# Patient Record
(demographics unavailable — no encounter records)

---

## 2024-11-19 NOTE — PHYSICAL EXAM
[Normal Mood and Affect] : normal mood and affect [Able to Communicate] : able to communicate [Well Developed] : well developed [Well Nourished] : well nourished [NL (0)] : extension 0 degrees [5___] : hamstring 5[unfilled]/5 [Right] : right knee [AP] : anteroposterior [Lateral] : lateral [[place]] : [place] [Advanced patellofemoral OA] : Advanced patellofemoral OA [] : negative Lachmann [FreeTextEntry3] : 2cm clean scab and abrasionover lateral tibial plateau. 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 Slight chondrocalcinosis as well. [TWNoteComboBox7] : flexion 135 degrees

## 2024-11-19 NOTE — PLAN
[TextEntry] : The natural progression of osteoarthritis was explained to the patient.  We discussed the possible treatment options from conservative to operative.  These included NSAIDs, Glucosamine and Chondroitin sulfate, and physical therapy as well different types of injections.  We also discussed that at some point they may progress to need a TKA.  Information and pamphlets were given.  Medication was injected into the above treated area. After verbal consent using sterile preparation and technique. The risks, benefits, and alternatives to cortisone injection were explained in full to the patient. Risks outlined include but are not limited to infection, sepsis, bleeding, scarring, skin discoloration, temporary increase in pain, syncopal episode, failure to resolve symptoms, allergic reaction, symptom recurrence, and elevation of blood sugar in diabetics. Patient understood the risks. All questions were answered. After discussion of options, patient requested an injection. Oral informed consent was obtained and sterile prep was done of the injection site. Sterile technique was utilized for the procedure including the preparation of the solutions used for the injection. Patient tolerated the procedure well. Advised to ice the injection site this evening. Prep with Betadine locally to site. Sterile technique used. Patient tolerated procedure well. Post Procedure Instructions: Patient was advised to call if redness, pain, or fever occur and apply ice for 15 min. out of every hour for the next 12-24 hours as tolerated.  The patient was instructed on the importance of ice and elevation of the extremity to decrease swelling and pain.

## 2024-11-19 NOTE — HISTORY OF PRESENT ILLNESS
[8] : 8 [Sharp] : sharp [Stabbing] : stabbing [Constant] : constant [Meds] : meds [Ice] : ice [Standing] : standing [Walking] : walking [Stairs] : stairs [Retired] : Work status: retired [FreeTextEntry7] : above and below knee [5] : 5 [de-identified] : 11/19/24:  Patient returns for recurrent right knee pain about six months after cortisone injection. Would like to repeat the injection.  5/14/24  Initial visit for this 86 year old female who fell at home x 3 weeks ago, injuring rt knee and calf. Had acute pain and swelling. Went to  where xrays were negative for a fx. D/c'd home with ace bandage. Applying bacitracin to a superficial cut over rt knee. Since then c/o persistent pain and swelling rt knee. Using a walker.  PMH: No prior rt knee issue [] : no [FreeTextEntry1] : rightjonnaee [de-identified] : jose herbert